# Patient Record
Sex: FEMALE | Race: WHITE
[De-identification: names, ages, dates, MRNs, and addresses within clinical notes are randomized per-mention and may not be internally consistent; named-entity substitution may affect disease eponyms.]

---
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
## 2020-01-15 ENCOUNTER — HOSPITAL ENCOUNTER (EMERGENCY)
Dept: HOSPITAL 50 - VM.ED | Age: 20
Discharge: HOME | End: 2020-01-15
Payer: COMMERCIAL

## 2020-01-15 VITALS — DIASTOLIC BLOOD PRESSURE: 74 MMHG | SYSTOLIC BLOOD PRESSURE: 109 MMHG | HEART RATE: 78 BPM

## 2020-01-15 DIAGNOSIS — F32.9: ICD-10-CM

## 2020-01-15 DIAGNOSIS — F41.9: ICD-10-CM

## 2020-01-15 DIAGNOSIS — Z79.899: ICD-10-CM

## 2020-01-15 DIAGNOSIS — E86.0: Primary | ICD-10-CM

## 2020-01-15 LAB
ANION GAP SERPL CALC-SCNC: 12.3 MMOL/L (ref 10–20)
CHLORIDE SERPL-SCNC: 105 MMOL/L (ref 98–107)
SODIUM SERPL-SCNC: 140 MMOL/L (ref 136–145)

## 2020-01-15 NOTE — CT
______________________________________________________________________________   

  

[id number] CT/CT Head WO IV  

EXAM: CT Head WO IV  

   

 CLINICAL DATA: HEADACHE.  

   

 COMPARISON STUDY: None  

   

 FINDINGS:  

   

 No intracranial hemorrhage, extra-axial fluid collection, mass, or acute  

 ischemia.   

   

 Soft tissues are unremarkable. Paranasal sinuses and mastoid air cells are  

 clear.  

    

 IMPRESSION:  

   

 No acute intracranial findings.  

   

 Electronically signed by Junior Davila MD on 1/15/2020 8:32 AM  

   

  

Junior Davila DO                 

 01/15/20 0834    

  

Thank you for allowing us to participate in the care of your patient.

## 2020-01-15 NOTE — EDM.PDOC
ED HPI GENERAL MEDICAL PROBLEM





- General


Chief Complaint: Syncope


Stated Complaint: ER


Time Seen by Provider: 01/15/20 07:30


Source of Information: Reports: Patient, EMS, RN Notes Reviewed


History Limitations: Reports: No Limitations





- History of Present Illness


INITIAL COMMENTS - FREE TEXT/NARRATIVE: 





Pt. presents to ER following a syncopal episode while working out this AM. 

There were witnesses to the event. It lasted less than a minute. There was no 

tonic/clonic movement. The patient was not incontinent of  urine. EMS was 

summoned. Pt. remained hemodynamically stable during transport.


Pt. states that she has been ill, complaining of cough, chest congestion, and 

fatigue. She states that she was experiencing some sore throat but that has 

since resolved.


Pt. states that she is experiencing nausea but denies vomiting or diarrhea. No 

melena, hematochezia, or hematemesis. She does complain of some headache. She 

states that she continues to get lightheaded when she goes from lying to 

sitting. 


She denies any numbness/tingling in extremities, difficulty with speech/

ambulation, vision loss or change or other focal neuro symptoms.


Pt. was given a dose of oral glucose at scene. Her blood sugar 15 min after was 

93 mg/dl.


Onset: Today


Location: Reports: Generalized


Associated Symptoms: Reports: Headaches, Nausea/Vomiting, Syncope.  Denies: 

Diaphoresis, Fever/Chills


  ** Frontal Headache


Pain Score (Numeric/FACES): 6





- Related Data


 Allergies











Allergy/AdvReac Type Severity Reaction Status Date / Time


 


No Known Allergies Allergy   Verified 01/15/20 07:56











Home Meds: 


 Home Meds





Escitalopram [Lexapro] 10 mg DAILY 01/15/20 [History]


Levonorgestrel-Ethin Estradiol [Falmina-28 Tablet] 1 tab DAILY 01/15/20 [History

]











Past Medical History





- Past Health History


Medical/Surgical History: Denies Medical/Surgical History


Respiratory History: Reports: Other (See Below)


Other Respiratory History: Exercise induced astma.  Last used inhaler in April 

of this year.


Psychiatric History: Reports: Anxiety, Depression





- Past Surgical History


HEENT Surgical History: Reports: Adenoidectomy, Oral Surgery, Tonsillectomy





Social & Family History





- Family History


HEENT: Reports: Sinusitis


Cardiac: Reports: Bypass, High Cholesterol, Hypertension, MI, Pacemaker, Stent


Respiratory: Reports: Asthma, COPD


GI: Reports: Celiac Disease


Neurological: Reports: MS


Endocrine/Metabolic: Reports: Diabetes, Type I, Other (See Below)


Other Endocrine/Metabolic Family History: Sister type I at the age of 4.


Hematologic: Reports: B12 Deficiency


Dermatologic: Reports: Eczema


Oncologic: Reports: Ovarian





- Tobacco Use


Smoking Status *Q: Never Smoker





- Caffeine Use


Caffeine Use: Reports: None





- Recreational Drug Use


Recreational Drug Use: No





ED ROS GENERAL





- Review of Systems


Review Of Systems: See Below


Constitutional: Reports: Fatigue


HEENT: Reports: No Symptoms


Respiratory: Reports: No Symptoms


Cardiovascular: Reports: Lightheadedness, Syncope.  Denies: Chest Pain, Dyspnea 

on Exertion, Edema, Palpitations


Endocrine: Reports: No Symptoms


GI/Abdominal: Reports: No Symptoms


: Reports: No Symptoms


Musculoskeletal: Reports: No Symptoms


Skin: Reports: No Symptoms


Neurological: Reports: No Symptoms


Psychiatric: Reports: Depression


Hematologic/Lymphatic: Reports: No Symptoms


Immunologic: Reports: No Symptoms





ED EXAM, GENERAL





- Physical Exam


Exam: See Below


Exam Limited By: No Limitations


General Appearance: Alert, WD/WN, No Apparent Distress


Eye Exam: Bilateral Eye: EOMI, Normal Fundi, Normal Inspection, PERRL


Ears: Normal External Exam, Normal Canal, Hearing Grossly Normal, Normal TMs


Ear Exam: Bilateral Ear: Auricle Normal, Canal Normal, TM normal


Nose: Normal Inspection, Normal Mucosa, No Blood


Throat/Mouth: Normal Inspection, Normal Lips, Normal Teeth, Normal Gums, Normal 

Oropharynx, Normal Voice, No Airway Compromise


Head: Atraumatic, Normocephalic


Neck: Normal Inspection, Supple, Non-Tender, Full Range of Motion


Respiratory/Chest: No Respiratory Distress, Lungs Clear, Normal Breath Sounds, 

No Accessory Muscle Use, Chest Non-Tender


Cardiovascular: Normal Peripheral Pulses, Regular Rate, Rhythm, No Edema, No 

Gallop, No JVD, No Murmur, No Rub


Peripheral Pulses: 4+: Radial (L)


GI/Abdominal: Normal Bowel Sounds, Soft, Non-Tender, No Organomegaly, No 

Distention, No Abnormal Bruit, No Mass, Pelvis Stable


 (Female) Exam: Deferred


Rectal (Female) Exam: Deferred


Back Exam: Normal Inspection, Full Range of Motion


Extremities: Normal Inspection, Normal Range of Motion, Non-Tender, No Pedal 

Edema, Normal Capillary Refill


Neurological: Alert, Oriented, CN II-XII Intact, Normal Cognition, Normal Gait, 

Normal Reflexes, No Motor/Sensory Deficits


Psychiatric: Normal Affect, Normal Mood


Skin Exam: Warm, Dry, Intact, Normal Color, No Rash


Lymphatic: No Adenopathy





Course





- Vital Signs


Last Recorded V/S: 


 Last Vital Signs











Temp  37.7 C   01/15/20 07:23


 


Pulse  78   01/15/20 07:23


 


Resp  16   01/15/20 07:23


 


BP  109/74   01/15/20 07:23


 


Pulse Ox  97   01/15/20 07:23














- Orders/Labs/Meds


Orders: 


 Active Orders 24 hr











 Category Date Time Status


 


 EKG Documentation Completion [RC] STAT Care  01/15/20 07:44 Active


 


 Chest w Cont [CT] Stat Exams  01/15/20 07:45 Stop Req


 


 Sodium Chloride 0.9% [Saline Flush] Med  01/15/20 07:44 Active





 10 ml FLUSH ASDIRECTED PRN   


 


 Peripheral IV Insertion Adult [OM.PC] Routine Oth  01/15/20 07:45 Ordered








 Medication Orders





Sodium Chloride (Saline Flush)  10 ml FLUSH ASDIRECTED PRN


   PRN Reason: Keep Vein Open








Labs: 


 Laboratory Tests











  01/15/20 01/15/20 01/15/20 Range/Units





  07:55 07:55 08:49 


 


WBC  6.6    (4.0-10.0)  x10^3/uL


 


RBC  4.21    (4.00-5.50)  x10^6/uL


 


Hgb  12.4    (12.0-16.0)  g/dL


 


Hct  36.5    (33.0-47.0)  %


 


MCV  86.7    (78.0-93.0)  fL


 


MCH  29.5    (26.0-32.0)  pg


 


MCHC  34.0    (32.0-36.0)  g/dL


 


RDW Coeff of Donya  12.8    (10.0-15.0)  %


 


Plt Count  181    (130-400)  x10^3/uL


 


Neut % (Auto)  80.7 H    (50.0-80.0)  %


 


Lymph % (Auto)  9.0 L    (25.0-50.0)  %


 


Mono % (Auto)  8.4    (2.0-11.0)  %


 


Eos % (Auto)  1.4    (0.0-4.0)  %


 


Baso % (Auto)  0.5    (0.2-1.2)  %


 


Sodium   140   (136-145)  mmol/L


 


Potassium   4.3   (3.5-5.1)  mmol/L


 


Chloride   105   ()  mmol/L


 


Carbon Dioxide   27   (21-32)  mmol/L


 


Anion Gap   12.3   (10-20)  mmol/L


 


BUN   16   (7-18)  mg/dL


 


Creatinine   0.9   (0.55-1.02)  mg/dL


 


Est Cr Clr Drug Dosing   89.99   mL/min


 


Estimated GFR (MDRD)   > 60   


 


Glucose   92   ()  mg/dL


 


Calcium   9.0   (8.5-10.1)  mg/dL


 


Corrected Calcium   9.56   (8.5-10.1)  mg/dL


 


Phosphorus   1.8 L   (2.6-4.7)  mg/dL


 


Magnesium   1.7 L   (1.8-2.4)  mg/dL


 


Total Bilirubin   0.6   (0.2-1.0)  mg/dL


 


AST   18   (15-37)  U/L


 


ALT   20   (14-59)  U/L


 


Alkaline Phosphatase   65   ()  U/L


 


Troponin I   < 0.017   (<=0.056)  ng/mL


 


C-Reactive Protein   0.3   (<=0.9)  mg/dL


 


Total Protein   6.7   (6.4-8.2)  g/dL


 


Albumin   3.3 L   (3.4-5.0)  g/dL


 


Globulin   3.4   


 


Albumin/Globulin Ratio   0.97   


 


TSH, Ultra Sensitive   1.086   (0.516-4.13)  uIU/mL


 


Urine Color    Yellow  (YELLOW)  


 


Urine Appearance    Cloudy H  (CLEAR)  


 


Urine pH    8.5 H  (5.0-8.0)  


 


Ur Specific Gravity    1.020  


 


Urine Protein    30 H  (NEGATIVE)  mg/dL


 


Urine Glucose (UA)    Negative  (NEGATIVE)  mg/dL


 


Urine Ketones    Negative  (NEGATIVE)  mg/dL


 


Urine Occult Blood    Negative  (NEGATIVE)  


 


Urine Nitrite    Negative  (NEGATIVE)  


 


Urine Bilirubin    Negative  (NEGATIVE)  


 


Urine Urobilinogen    0.2  (0.2)  EU/dL


 


Ur Leukocyte Esterase    Negative  (NEGATIVE)  


 


Urine RBC    0-5  (NOT SEEN)  /HPF


 


Urine WBC    0-5  (NOT SEEN)  /HPF


 


Ur Squamous Epith Cells    Few H  (NEGATIVE)  /HPF


 


Amorphous Sediment    Many  


 


Urine Bacteria    Not seen  (NEGATIVE)  /HPF


 


Hyaline Casts    Rare H  (NEGATIVE)  /HPF


 


Urine Mucus    Few H  (NEGATIVE)  /LPF











Meds: 


Medications











Generic Name Dose Route Start Last Admin





  Trade Name Donna  PRN Reason Stop Dose Admin


 


Sodium Chloride  10 ml  01/15/20 07:44  





  Saline Flush  FLUSH   





  ASDIRECTED PRN   





  Keep Vein Open   





     





     





     














Discontinued Medications














Generic Name Dose Route Start Last Admin





  Trade Name Donna  PRN Reason Stop Dose Admin


 


Lactated Ringer's  1,000 mls @ 1,000 mls/hr  01/15/20 07:49  01/15/20 07:42





  Ringers, Lactated  IV  01/15/20 08:48  1,000 mls/hr





  ONETIME ONE   Administration





     





     





     





     


 


Ondansetron HCl  4 mg  01/15/20 07:49  01/15/20 08:00





  Zofran  IVPUSH  01/15/20 07:50  4 mg





  ONETIME ONE   Administration





     





     





     





     














- Re-Assessments/Exams


Free Text/Narrative Re-Assessment/Exam: 


Pt. was given a liter of LR IV and zofran 4mg IV. She reported that she was 

less lightheaded after the fluid, and states that her headache and nausea had 

resolved. She was in visiting with family and laughing at time of discharge.Pt. 

specific gravity after a liter of fluid was 1.020, she had increase in heart 

rate and lightheadedness initially before fluid which resolved as well. The 

rest of her labs were all within normal limits. CT brain, chest x-ray, and EKG 

were normal. Influenza was negative. Blood sugar was 92mg/dl after the dose of 

oral glucose, so there may have been a component of resolved hypoglycemia. Pt. 

was alert and oriented at time of discharge.





Departure





- Departure


Time of Disposition: 09:50


Disposition: Home, Self-Care 01


Clinical Impression: 


 Dehydration, Syncope








- Discharge Information


Instructions:  Dehydration, Adult, Easy-to-Read, Syncope, Easy-to-Read


Referrals: 


Caterina Mckeon,  [Primary Care Provider] - 


Forms:  ED Department Discharge, Refusal of Exam and Treatment


Additional Instructions: 


Home to rest.





Increase your intake of fluids.





Off school today.





Tylenol or ibuprofen as needed for discomfort.





Recheck in clinic in 10-14 days, sooner if not gradually improving.





Sepsis Event Note





- Evaluation


Sepsis Screening Result: No Definite Risk





- Focused Exam


Vital Signs: 


 Vital Signs











  Temp Pulse Resp BP Pulse Ox


 


 01/15/20 07:23  37.7 C  78  16  109/74  97











Date Exam was Performed: 01/15/20


Time Exam was Performed: 09:48





- Problem List Review


Problem List Initiated/Reviewed/Updated: Yes





- My Orders


Last 24 Hours: 


My Active Orders





01/15/20 07:44


EKG Documentation Completion [RC] STAT 


Sodium Chloride 0.9% [Saline Flush]   10 ml FLUSH ASDIRECTED PRN 





01/15/20 07:45


Chest w Cont [CT] Stat 


Peripheral IV Insertion Adult [OM.PC] Routine 














- Assessment/Plan


Last 24 Hours: 


My Active Orders





01/15/20 07:44


EKG Documentation Completion [RC] STAT 


Sodium Chloride 0.9% [Saline Flush]   10 ml FLUSH ASDIRECTED PRN 





01/15/20 07:45


Chest w Cont [CT] Stat 


Peripheral IV Insertion Adult [OM.PC] Routine 











Plan: 





Home to rest.





Increase your intake of fluids.





Off school today.





Tylenol or ibuprofen as needed for discomfort.





Recheck in clinic in 10-14 days, sooner if not gradually improving.

## 2020-01-15 NOTE — CR
______________________________________________________________________________   

  

[id number] RAD/RAD Chest PA or AP 1V  

EXAM:  RAD Chest PA or AP 1V  

   

 INDICATION:  HEADACHE.  

   

 COMPARISON:  None.  

   

 DISCUSSION:    

   

 Cardiomediastinal silhouette is normal in size and contour.  

   

 No infiltrate, effusion, pneumothorax, or edema.  

   

 IMPRESSION:  

 No significant cardiopulmonary abnormality.  

   

 Electronically signed by Junior Davila MD on 1/15/2020 8:28 AM  

   

  

Junior Davila DO                 

 01/15/20 0831    

  

Thank you for allowing us to participate in the care of your patient.

## 2023-03-17 ENCOUNTER — HOSPITAL ENCOUNTER (EMERGENCY)
Dept: HOSPITAL 50 - VM.ED | Age: 23
Discharge: HOME | End: 2023-03-17
Payer: COMMERCIAL

## 2023-03-17 VITALS — SYSTOLIC BLOOD PRESSURE: 115 MMHG | DIASTOLIC BLOOD PRESSURE: 72 MMHG | HEART RATE: 91 BPM

## 2023-03-17 DIAGNOSIS — K52.9: Primary | ICD-10-CM

## 2023-03-17 RX ADMIN — SODIUM CHLORIDE ONE MG: 9 INJECTION, SOLUTION INTRAVENOUS at 20:30

## 2023-03-17 RX ADMIN — PROCHLORPERAZINE EDISYLATE ONE MG: 5 INJECTION INTRAMUSCULAR; INTRAVENOUS at 21:10

## 2023-03-17 RX ADMIN — ONDANSETRON ONE PACKET: 4 TABLET, ORALLY DISINTEGRATING ORAL at 21:45
